# Patient Record
Sex: MALE | Race: WHITE | NOT HISPANIC OR LATINO | Employment: UNEMPLOYED | URBAN - METROPOLITAN AREA
[De-identification: names, ages, dates, MRNs, and addresses within clinical notes are randomized per-mention and may not be internally consistent; named-entity substitution may affect disease eponyms.]

---

## 2020-10-08 ENCOUNTER — APPOINTMENT (OUTPATIENT)
Dept: RADIOLOGY | Facility: CLINIC | Age: 20
End: 2020-10-08

## 2020-10-08 ENCOUNTER — TRANSCRIBE ORDERS (OUTPATIENT)
Dept: URGENT CARE | Facility: CLINIC | Age: 20
End: 2020-10-08

## 2020-10-08 DIAGNOSIS — Z02.1 PHYSICAL EXAM, PRE-EMPLOYMENT: Primary | ICD-10-CM

## 2020-10-08 DIAGNOSIS — Z02.1 PHYSICAL EXAM, PRE-EMPLOYMENT: ICD-10-CM

## 2020-10-08 PROCEDURE — 71046 X-RAY EXAM CHEST 2 VIEWS: CPT

## 2022-10-25 ENCOUNTER — OFFICE VISIT (OUTPATIENT)
Dept: URGENT CARE | Facility: MEDICAL CENTER | Age: 22
End: 2022-10-25
Payer: COMMERCIAL

## 2022-10-25 VITALS
OXYGEN SATURATION: 97 % | BODY MASS INDEX: 38.74 KG/M2 | HEIGHT: 72 IN | HEART RATE: 72 BPM | WEIGHT: 286 LBS | RESPIRATION RATE: 18 BRPM | SYSTOLIC BLOOD PRESSURE: 132 MMHG | DIASTOLIC BLOOD PRESSURE: 64 MMHG | TEMPERATURE: 98 F

## 2022-10-25 DIAGNOSIS — B34.9 VIRAL ILLNESS: Primary | ICD-10-CM

## 2022-10-25 LAB
SARS-COV-2 AG UPPER RESP QL IA: NEGATIVE
VALID CONTROL: NORMAL

## 2022-10-25 PROCEDURE — 87811 SARS-COV-2 COVID19 W/OPTIC: CPT | Performed by: STUDENT IN AN ORGANIZED HEALTH CARE EDUCATION/TRAINING PROGRAM

## 2022-10-25 PROCEDURE — 99213 OFFICE O/P EST LOW 20 MIN: CPT | Performed by: STUDENT IN AN ORGANIZED HEALTH CARE EDUCATION/TRAINING PROGRAM

## 2022-10-25 RX ORDER — BENZONATATE 100 MG/1
100 CAPSULE ORAL 3 TIMES DAILY PRN
Qty: 20 CAPSULE | Refills: 0 | Status: SHIPPED | OUTPATIENT
Start: 2022-10-25

## 2022-10-25 RX ORDER — LIDOCAINE HYDROCHLORIDE 20 MG/ML
15 SOLUTION OROPHARYNGEAL 4 TIMES DAILY PRN
Qty: 100 ML | Refills: 0 | Status: SHIPPED | OUTPATIENT
Start: 2022-10-25

## 2022-10-25 RX ORDER — FLUTICASONE PROPIONATE 50 MCG
2 SPRAY, SUSPENSION (ML) NASAL 2 TIMES DAILY
Qty: 11.1 ML | Refills: 0 | Status: SHIPPED | OUTPATIENT
Start: 2022-10-25

## 2022-10-25 NOTE — LETTER
October 25, 2022     Patient: Tori Zabala   YOB: 2000   Date of Visit: 10/25/2022       To Whom it May Concern:    Debora Sims was seen in my clinic on 10/25/2022  He may return to work on 10/26/2022  If you have any questions or concerns, please don't hesitate to call           Sincerely,          Marii Thornton DO        CC: No Recipients

## 2022-10-25 NOTE — PROGRESS NOTES
[facility]        NAME: Akshat Cruz is a 23 y o  male  : 2000    MRN: [id number]  DATE: 2022  TIME: 10:18 AM    Assessment and Orders   Viral illness [B34 9]  1  Viral illness  Poct Covid 19 Rapid Antigen Test    fluticasone (FLONASE) 50 mcg/act nasal spray    Lidocaine Viscous HCl (XYLOCAINE) 2 % mucosal solution    benzonatate (TESSALON PERLES) 100 mg capsule         Plan and Discussion      Symptoms and exam consistent with viral illness  Will treat symptomatically with viscous lidocaine, Tessalon Perles, and Flonase  Counseling:  Encourage rest and adequate fluid intake    Discussed reasons to report immediately to ED for evaluation:  Shortness of breath or difficulty breathing    Risks and benefits discussed  Patient understands and agrees with the plan  Follow up with PCP  Chief Complaint     Chief Complaint   Patient presents with   • COVID-19 Exposure     Pt  Exposed to daughter who was covid positive, pt  Started with symptoms a few days ago, c/o cold sweats, sore throat, coughing, headache          History of Present Illness       URI   This is a new problem  The current episode started in the past 7 days (Saturday)  The problem has been unchanged  There has been no fever  Associated symptoms include congestion, coughing, headaches, rhinorrhea, sinus pain, sneezing and a sore throat  Pertinent negatives include no abdominal pain, chest pain, diarrhea, dysuria, ear pain, joint pain, joint swelling, nausea, neck pain, plugged ear sensation, rash, swollen glands, vomiting or wheezing  He has tried acetaminophen for the symptoms  The treatment provided mild relief  Review of Systems   Review of Systems   HENT: Positive for congestion, rhinorrhea, sinus pain, sneezing and sore throat  Negative for ear pain  Respiratory: Positive for cough  Negative for wheezing  Cardiovascular: Negative for chest pain     Gastrointestinal: Negative for abdominal pain, diarrhea, nausea and vomiting  Genitourinary: Negative for dysuria  Musculoskeletal: Negative for joint pain and neck pain  Skin: Negative for rash  Neurological: Positive for headaches  Current Medications       Current Outpatient Medications:   •  benzonatate (TESSALON PERLES) 100 mg capsule, Take 1 capsule (100 mg total) by mouth 3 (three) times a day as needed for cough, Disp: 20 capsule, Rfl: 0  •  fluticasone (FLONASE) 50 mcg/act nasal spray, 2 sprays into each nostril 2 (two) times a day, Disp: 11 1 mL, Rfl: 0  •  Lidocaine Viscous HCl (XYLOCAINE) 2 % mucosal solution, Swish and spit 15 mL 4 (four) times a day as needed for mouth pain or discomfort Gargle and spit 15mL 4x a day as needed for sore throat, Disp: 100 mL, Rfl: 0    Current Allergies     Allergies as of 10/25/2022   • (No Known Allergies)            The following portions of the patient's history were reviewed and updated as appropriate: allergies, current medications, past family history, past medical history, past social history, past surgical history and problem list      History reviewed  No pertinent past medical history  History reviewed  No pertinent surgical history  History reviewed  No pertinent family history  Medications have been verified  Objective   /64   Pulse 72   Temp 98 °F (36 7 °C)   Resp 18   Ht 6' (1 829 m)   Wt 130 kg (286 lb)   SpO2 97%   BMI 38 79 kg/m²   No LMP for male patient  Physical Exam     Physical Exam  Constitutional:       General: He is not in acute distress  Appearance: He is not ill-appearing or toxic-appearing  HENT:      Head: Normocephalic and atraumatic  Right Ear: Tympanic membrane and external ear normal       Left Ear: Tympanic membrane and external ear normal       Nose: Congestion present  Comments: Boggy nasal turbinates     Mouth/Throat:      Pharynx: Posterior oropharyngeal erythema present        Comments: Cobblestone appearance in posterior pharynx  Cardiovascular:      Rate and Rhythm: Normal rate and regular rhythm  Pulmonary:      Effort: Pulmonary effort is normal  No respiratory distress  Breath sounds: No wheezing  Neurological:      General: No focal deficit present  Mental Status: He is alert and oriented to person, place, and time  Psychiatric:         Mood and Affect: Mood normal          Behavior: Behavior normal          Thought Content:  Thought content normal          Judgment: Judgment normal                Pauly Thornton DO

## 2023-12-30 ENCOUNTER — OFFICE VISIT (OUTPATIENT)
Dept: URGENT CARE | Facility: MEDICAL CENTER | Age: 23
End: 2023-12-30
Payer: COMMERCIAL

## 2023-12-30 VITALS
DIASTOLIC BLOOD PRESSURE: 80 MMHG | OXYGEN SATURATION: 96 % | RESPIRATION RATE: 20 BRPM | SYSTOLIC BLOOD PRESSURE: 110 MMHG | BODY MASS INDEX: 38.38 KG/M2 | TEMPERATURE: 98.6 F | HEART RATE: 133 BPM | WEIGHT: 283 LBS

## 2023-12-30 DIAGNOSIS — R68.89 FLU-LIKE SYMPTOMS: Primary | ICD-10-CM

## 2023-12-30 PROCEDURE — 99212 OFFICE O/P EST SF 10 MIN: CPT | Performed by: PHYSICIAN ASSISTANT

## 2023-12-30 NOTE — PROGRESS NOTES
[facility]        NAME: Abe Moya is a 23 y.o. male  : 2000    MRN: [id number]  DATE: 2023  TIME: 9:30 AM    Assessment and Plan   Flu-like symptoms [R68.89]  1. Flu-like symptoms              Patient Instructions     Take Tylenol or Motrin as needed for fever or pain  May take over the counter cold medication to control symptoms  Drink plenty of fluids  Rest  You are contagious until fever resolves  If symptoms worsen go to the ER for further evaluation  Follow up with PCP in 3-5 days.  Proceed to  ER if symptoms worsen.    Chief Complaint     Chief Complaint   Patient presents with   • Chills   • Generalized Body Aches     Sx started last . Taking Excedrin.    • Cough   • Fatigue   • Poor Nutritional Intake         History of Present Illness       Patient presents with a 6-day history of chills, fever, fatigue, cough and body ache.  Overall he is feeling somewhat better.  He did not have a flu vaccine.      Review of Systems   Review of Systems   Constitutional:  Positive for chills, fatigue and fever.   HENT:  Negative for congestion, rhinorrhea and sore throat.    Respiratory:  Positive for cough.    Gastrointestinal:  Positive for nausea and vomiting. Negative for diarrhea.   Musculoskeletal:  Positive for myalgias.   Neurological:  Positive for dizziness and headaches.         Current Medications       Current Outpatient Medications:   •  benzonatate (TESSALON PERLES) 100 mg capsule, Take 1 capsule (100 mg total) by mouth 3 (three) times a day as needed for cough (Patient not taking: Reported on 2023), Disp: 20 capsule, Rfl: 0  •  fluticasone (FLONASE) 50 mcg/act nasal spray, 2 sprays into each nostril 2 (two) times a day (Patient not taking: Reported on 2023), Disp: 11.1 mL, Rfl: 0  •  Lidocaine Viscous HCl (XYLOCAINE) 2 % mucosal solution, Swish and spit 15 mL 4 (four) times a day as needed for mouth pain or discomfort Gargle and spit 15mL 4x a day as  needed for sore throat (Patient not taking: Reported on 12/30/2023), Disp: 100 mL, Rfl: 0    Current Allergies     Allergies as of 12/30/2023   • (No Known Allergies)            The following portions of the patient's history were reviewed and updated as appropriate: allergies, current medications, past family history, past medical history, past social history, past surgical history and problem list.     History reviewed. No pertinent past medical history.    History reviewed. No pertinent surgical history.    History reviewed. No pertinent family history.      Medications have been verified.        Objective   /80   Pulse (!) 133   Temp 98.6 °F (37 °C) (Oral)   Resp 20   Wt 128 kg (283 lb)   SpO2 96%   BMI 38.38 kg/m²   No LMP for male patient.       Physical Exam     Physical Exam  Vitals and nursing note reviewed.   Constitutional:       Appearance: Normal appearance.   HENT:      Head: Normocephalic and atraumatic.      Right Ear: Tympanic membrane normal.      Left Ear: Tympanic membrane normal.      Mouth/Throat:      Mouth: Mucous membranes are moist.      Pharynx: Oropharynx is clear.   Eyes:      Conjunctiva/sclera: Conjunctivae normal.   Cardiovascular:      Rate and Rhythm: Normal rate and regular rhythm.      Heart sounds: Normal heart sounds.   Pulmonary:      Effort: Pulmonary effort is normal.      Breath sounds: Normal breath sounds.   Musculoskeletal:      Cervical back: Neck supple.   Lymphadenopathy:      Cervical: No cervical adenopathy.   Skin:     General: Skin is warm.   Neurological:      Mental Status: He is alert.